# Patient Record
(demographics unavailable — no encounter records)

---
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
## 2024-11-20 NOTE — PHYSICAL EXAM
[General Appearance - Alert] : alert [General Appearance - In No Acute Distress] : in no acute distress [Oriented To Time, Place, And Person] : oriented to person, place, and time [Impaired Insight] : insight and judgment were intact [Affect] : the affect was normal [Person] : oriented to person [Place] : oriented to place [Time] : oriented to time [Cranial Nerves Optic (II)] : visual acuity intact bilaterally,  visual fields full to confrontation, pupils equal round and reactive to light [Cranial Nerves Oculomotor (III)] : extraocular motion intact [Cranial Nerves Trigeminal (V)] : facial sensation intact symmetrically [Cranial Nerves Facial (VII)] : face symmetrical [Cranial Nerves Vestibulocochlear (VIII)] : hearing was intact bilaterally [Cranial Nerves Glossopharyngeal (IX)] : tongue and palate midline [Cranial Nerves Accessory (XI - Cranial And Spinal)] : head turning and shoulder shrug symmetric [Cranial Nerves Hypoglossal (XII)] : there was no tongue deviation with protrusion [Motor Strength] : muscle strength was normal in all four extremities [Motor Handedness Right-Handed] : the patient is right hand dominant [Sensation Tactile Decrease] : light touch was intact [Abnormal Walk] : normal gait [Dysdiadochokinesia Bilaterally] : not present [Normal] : Normal [Left Paraspinal ___ (level)] : ~Ulevel [unfilled] left paraspinal [Muscular] : muscular [Paraspinal] : paraspinal [1] : C1 [2] : C2 [Full] : Full
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
## 2024-11-20 NOTE — REVIEW OF SYSTEMS
[Neck Pain] : neck pain [As Noted in HPI] : as noted in HPI [Dizziness] : dizziness [Negative] : Heme/Lymph
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
## 2025-01-31 NOTE — ASSESSMENT
[FreeTextEntry1] : 23-year-old woman right-handed with a history of recurrent headache, getting them more than 15 days a month with only partial success with Nurtec 75 mg as needed.  Did not respond to Qulipta 60 mg daily, has also tried verapamil, triptans. Reviewed and discussed treatment of migraines. Plan: Will do a trial with Emgality 120 mg subcu x 2 today is a started treatment.  Will continue with the 120 mg subcu monthly after that. Advised to maintain headache diary. If no significant improvement with the Emgality, may consider Botox therapy. She continue using the Nurtec 75 mg p.o. daily as needed. Return to office in 3 to 4 months.
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
## 2025-01-31 NOTE — PROCEDURE
[FreeTextEntry1] : After obtaining verbal consent Emgality 120 mg injected subcu, using aseptic technique with alcohol in the right upper extremity and the right abdominal wall.  Patient tolerated procedure well.  No complication noted.
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
